# Patient Record
Sex: FEMALE | Race: WHITE | NOT HISPANIC OR LATINO | Employment: OTHER | URBAN - METROPOLITAN AREA
[De-identification: names, ages, dates, MRNs, and addresses within clinical notes are randomized per-mention and may not be internally consistent; named-entity substitution may affect disease eponyms.]

---

## 2017-07-24 RX ORDER — LOSARTAN POTASSIUM AND HYDROCHLOROTHIAZIDE 25; 100 MG/1; MG/1
TABLET ORAL DAILY
COMMUNITY
Start: 2013-04-16 | End: 2017-07-24

## 2017-07-24 RX ORDER — DILTIAZEM HYDROCHLORIDE 240 MG/1
240 CAPSULE, EXTENDED RELEASE ORAL DAILY
Qty: 30 CAPSULE | Refills: 0 | Status: SHIPPED | OUTPATIENT
Start: 2017-07-24 | End: 2017-11-06 | Stop reason: SDUPTHER

## 2017-07-24 RX ORDER — LOSARTAN POTASSIUM AND HYDROCHLOROTHIAZIDE 25; 100 MG/1; MG/1
1 TABLET ORAL DAILY
Qty: 30 TABLET | Refills: 0 | Status: SHIPPED | OUTPATIENT
Start: 2017-07-24

## 2017-07-24 RX ORDER — POTASSIUM CHLORIDE 750 MG/1
10 TABLET, EXTENDED RELEASE ORAL 2 TIMES DAILY
Qty: 90 TABLET | Refills: 0 | Status: SHIPPED | OUTPATIENT
Start: 2017-07-24 | End: 2017-07-24 | Stop reason: SDUPTHER

## 2017-07-24 RX ORDER — LOSARTAN POTASSIUM AND HYDROCHLOROTHIAZIDE 25; 100 MG/1; MG/1
1 TABLET ORAL DAILY
Qty: 90 TABLET | Refills: 0 | Status: SHIPPED | OUTPATIENT
Start: 2017-07-24 | End: 2017-07-24 | Stop reason: SDUPTHER

## 2017-07-24 RX ORDER — DILTIAZEM HYDROCHLORIDE 240 MG/1
240 CAPSULE, EXTENDED RELEASE ORAL DAILY
Qty: 90 CAPSULE | Refills: 0 | Status: SHIPPED | OUTPATIENT
Start: 2017-07-24 | End: 2017-07-24 | Stop reason: SDUPTHER

## 2017-07-24 RX ORDER — POTASSIUM CHLORIDE 750 MG/1
10 TABLET, EXTENDED RELEASE ORAL DAILY
Qty: 30 TABLET | Refills: 0 | Status: SHIPPED | OUTPATIENT
Start: 2017-07-24 | End: 2017-11-06 | Stop reason: SDUPTHER

## 2017-11-06 RX ORDER — POTASSIUM CHLORIDE 750 MG/1
10 TABLET, EXTENDED RELEASE ORAL DAILY
Qty: 30 TABLET | Refills: 0 | Status: SHIPPED | OUTPATIENT
Start: 2017-11-06

## 2017-11-06 RX ORDER — DILTIAZEM HYDROCHLORIDE 240 MG/1
240 CAPSULE, EXTENDED RELEASE ORAL DAILY
Qty: 30 CAPSULE | Refills: 0 | Status: SHIPPED | OUTPATIENT
Start: 2017-11-06

## 2017-11-06 NOTE — TELEPHONE ENCOUNTER
Called pt to advise of med refill and that she would need to make an appointment for further refills, received no answer, lmovm for pt to call us back        E-prescribed Diltiazem  mg 1 po qd # 30 R 0   Potassium Chloride 10 MEQ CR 1 po qd # 30 R 0

## 2018-01-17 ENCOUNTER — TELEPHONE (OUTPATIENT)
Dept: FAMILY MEDICINE CLINIC | Facility: CLINIC | Age: 73
End: 2018-01-17

## 2018-01-17 NOTE — TELEPHONE ENCOUNTER
Received faxed med refill request, called pt on both numbers to advise she needs to make an appt to be seen for further refills, received no answer, lmovm for pt to call us back

## 2018-06-18 DIAGNOSIS — E87.6 LOW POTASSIUM SYNDROME: Primary | ICD-10-CM

## 2018-06-19 RX ORDER — POTASSIUM CHLORIDE 750 MG/1
10 TABLET, EXTENDED RELEASE ORAL DAILY
Qty: 30 TABLET | Refills: 0 | OUTPATIENT
Start: 2018-06-19

## 2018-06-26 ENCOUNTER — TELEPHONE (OUTPATIENT)
Dept: FAMILY MEDICINE CLINIC | Facility: CLINIC | Age: 73
End: 2018-06-26

## 2018-06-26 NOTE — TELEPHONE ENCOUNTER
PHARM CALLING TO HAVE MED REFILLED. KLOR-CON 10 MG 1 TAB PO QD. PT HASN'T BEEN SEEN IN OUR OFFICE SINCE NOV. 2017. ADV PHARM THAT PT NEEDS TO MAKE APPT.

## 2022-02-01 ENCOUNTER — OFFICE VISIT (OUTPATIENT)
Dept: ORTHOPEDIC SURGERY | Facility: CLINIC | Age: 77
End: 2022-02-01

## 2022-02-01 VITALS — WEIGHT: 193.6 LBS | BODY MASS INDEX: 30.39 KG/M2 | HEIGHT: 67 IN

## 2022-02-01 DIAGNOSIS — M75.52 BURSITIS OF LEFT SHOULDER: ICD-10-CM

## 2022-02-01 DIAGNOSIS — M75.42 IMPINGEMENT SYNDROME OF LEFT SHOULDER: ICD-10-CM

## 2022-02-01 DIAGNOSIS — M75.112 NONTRAUMATIC INCOMPLETE TEAR OF LEFT ROTATOR CUFF: Primary | ICD-10-CM

## 2022-02-01 DIAGNOSIS — S46.112A LABRAL TEAR OF LONG HEAD OF BICEPS TENDON, LEFT, INITIAL ENCOUNTER: ICD-10-CM

## 2022-02-01 PROCEDURE — 99204 OFFICE O/P NEW MOD 45 MIN: CPT | Performed by: ORTHOPAEDIC SURGERY

## 2022-02-01 RX ORDER — CETIRIZINE HYDROCHLORIDE 10 MG/1
10 TABLET ORAL DAILY
COMMUNITY

## 2022-02-01 NOTE — PROGRESS NOTES
[facility] [place] [facility] Office Visit - Ronal Barnhart MD    Office Visit       Patient Name: Mari Burns    Chief Complaint:   Chief Complaint   Patient presents with   • Left Shoulder - Pain       Referring Physician: Referring, Self    History of Present Illness:   Mari Burns is a 77 y.o. female who presents with left body part: shoulder Reason: pain.  Onset:Onset: mechanical fall. The issue has been ongoing for 3 month(s). Pain is a 0/10 on the pain scale. Pain is described as Pain Characterization: aching. Associated symptoms include Symptoms: swelling. The pain is worse with sleeping and leisure; ice and pain medication and/or NSAID improve the pain. Previous treatments have included: NSAIDS. I have reviewed the patient's history of present illness as noted/entered above.    I have reviewed the patient's past medical history, surgical history, social history, family history, medications, and allergies as noted in the electronic medical record and as noted/entered.  I have reviewed the patient's review of systems as noted/enter and updated as noted in the patient's HPI.      LEFT SHOULDER    2nd Opinion -- saw Dr. Jeison Mazariegos; completed 12/27/2021 injection - helped    Fell March 2021    Partial rotator cuff tearing, partial possible full thickness tearing  Biceps tendon      Subjective   Subjective      Review of Systems   Constitutional: Positive for activity change. Negative for chills, fatigue and fever.   HENT: Negative.  Negative for congestion and dental problem.    Eyes: Negative.  Negative for blurred vision.   Respiratory: Negative.  Negative for shortness of breath.    Cardiovascular: Negative.  Negative for leg swelling.   Gastrointestinal: Negative.  Negative for abdominal pain.   Endocrine: Negative.  Negative for polyuria.   Genitourinary: Negative.  Negative for difficulty urinating.   Musculoskeletal: Positive for  arthralgias and neck stiffness.   Skin: Negative.    Allergic/Immunologic: Negative.    Neurological: Negative.    Hematological: Negative.  Negative for adenopathy.   Psychiatric/Behavioral: Negative.  Negative for behavioral problems.        Past Medical History:   Past Medical History:   Diagnosis Date   • Diabetes (HCC)    • Hypertension    • Osteoarthritis (arthritis due to wear and tear of joints)        Past Surgical History: History reviewed. No pertinent surgical history.    Family History:   Family History   Problem Relation Age of Onset   • Osteoarthritis Mother    • Hypertension Mother    • Diabetes Mother    • Heart attack Father    • Cancer Brother        Social History:   Social History     Socioeconomic History   • Marital status:    Tobacco Use   • Smoking status: Never Smoker   • Smokeless tobacco: Never Used   Vaping Use   • Vaping Use: Never used   Substance and Sexual Activity   • Alcohol use: Never   • Drug use: Never   • Sexual activity: Defer       Medications:   Current Outpatient Medications:   •  cetirizine (zyrTEC) 10 MG tablet, Take 10 mg by mouth Daily., Disp: , Rfl:   •  diltiazem XR (DILACOR XR) 240 MG 24 hr capsule, Take 1 capsule by mouth Daily., Disp: 30 capsule, Rfl: 0  •  losartan-hydrochlorothiazide (HYZAAR) 100-25 MG per tablet, Take 1 tablet by mouth Daily., Disp: 30 tablet, Rfl: 0  •  metFORMIN (GLUCOPHAGE) 500 MG tablet, Take 500 mg by mouth 3 (Three) Times a Day., Disp: , Rfl:   •  potassium chloride (K-DUR,KLOR-CON) 10 MEQ CR tablet, Take 1 tablet by mouth Daily., Disp: 30 tablet, Rfl: 0    Allergies:   Allergies   Allergen Reactions   • Hydrocodone-Acetaminophen Nausea And Vomiting   • Oxycodone-Acetaminophen Nausea And Vomiting       The following portions of the patient's history were reviewed and updated as appropriate: allergies, current medications, past family history, past medical history, past social history, past surgical history and problem list.       "  Objective    Objective      Vital Signs:   Vitals:    02/01/22 1431   Weight: 87.8 kg (193 lb 9.6 oz)   Height: 170.2 cm (67\")       Ortho Exam:  General: no acute distress, comfortable  Vitals reviewed in chart    Musculoskeletal Exam    SIDE: LEFT SHOULDER  Shoulder Exam:    Tenderness: rotator cuff and biceps tendon    Range of motion measurements (degrees)  Forward flexion/Abduction/External rotation at side/ER at 90/IR at 90/IR position  Active: 140/140/50/80/80   Passive: same    Painful arc of motion: yes  No evidence of septic joint  Pain with forward flexion and abduction greater: 90 degrees  Impingement testing Neer's test - positive/painful  Impingement testing Hawkin's test - positive/painful    Rotator Cuff Testing:  Tenderness to palpation at rotator cuff - YES  Rotator cuff testing Charles's test - pain but good strength  Rotator cuff testing External rotation - no  Rotator cuff testing Lag signs - no  Rotator cuff testing Belly press - no  Pain with abduction great than 90 degrees - yes    Scapular dyskinesis - present, abnormal scapular motion    Long head of the biceps testing:  Brito's test for biceps & Speed's test - painful  Bicipital groove tenderness to palpation/tenderness to palpation of biceps tendon - painful        Results Review:   Imaging Results (Last 24 Hours)     ** No results found for the last 24 hours. **          I personally interpreted her LEFT SHOULDER MRI 12/21/2021  LEFT SHOULDER  Partial tearing supra/infra/subscapularis  Partial vs full thickness tear LHB  Counseled the patient on the MRI findings    Procedures           Assessment / Plan      Assessment/Plan:   Problem List Items Addressed This Visit        Musculoskeletal and Injuries    Nontraumatic incomplete tear of left rotator cuff - Primary    Relevant Orders    Ambulatory Referral to Physical Therapy Evaluate and treat, Ortho (Completed)    Ambulatory Referral to Physical Therapy Evaluate and treat, Ortho " (Completed)    Bursitis of left shoulder    Relevant Orders    Ambulatory Referral to Physical Therapy Evaluate and treat, Ortho (Completed)    Ambulatory Referral to Physical Therapy Evaluate and treat, Ortho (Completed)    Impingement syndrome of left shoulder    Relevant Orders    Ambulatory Referral to Physical Therapy Evaluate and treat, Ortho (Completed)    Ambulatory Referral to Physical Therapy Evaluate and treat, Ortho (Completed)       Other    Labral tear of long head of biceps tendon, left, initial encounter    Relevant Orders    Ambulatory Referral to Physical Therapy Evaluate and treat, Ortho (Completed)    Ambulatory Referral to Physical Therapy Evaluate and treat, Ortho (Completed)          Counseled on operative vs nonoperative treatment LEFT shoulder  Counseled on arthroscopy  Recommended conservative course  MRI reviewed/interpreted and counseled  Balance issues - Silver Sneakers helping, Yoga for balance      Counseled on operative versus nonoperative treatment.  Patient desires to proceed with nonoperative course and I agree with that recommendation.  I think she will continue to do quite well with nonoperative treatment of the shoulder I will see her back in 2 months to reassess.  I did discuss what operative intervention would look like versus nonsurgical course and I think she has a fair bit of findings on MRI but no obvious significant full-thickness tearing primarily partial tearing on the rotator cuff side.    Follow Up: 2 months to reassess        Ronal Barnhart MD, FAAOS  Orthopedic Surgeon  Fellowship Trained Shoulder and Elbow Surgeon  [facility]  Orthopedics and Sports Medicine  [address] [place] [address], [address]  [place] [address]    02/01/22  15:08 EST

## 2025-01-23 ENCOUNTER — OFFICE VISIT (OUTPATIENT)
Dept: ORTHOPEDIC SURGERY | Facility: CLINIC | Age: 80
End: 2025-01-23
Payer: MEDICARE

## 2025-01-23 VITALS
SYSTOLIC BLOOD PRESSURE: 122 MMHG | WEIGHT: 193.56 LBS | BODY MASS INDEX: 30.38 KG/M2 | DIASTOLIC BLOOD PRESSURE: 80 MMHG | HEIGHT: 67 IN

## 2025-01-23 DIAGNOSIS — M75.21 BICEPS TENDINITIS OF RIGHT UPPER EXTREMITY: ICD-10-CM

## 2025-01-23 DIAGNOSIS — R29.818 PSEUDOPARALYSIS: ICD-10-CM

## 2025-01-23 DIAGNOSIS — S46.011A TRAUMATIC COMPLETE TEAR OF RIGHT ROTATOR CUFF, INITIAL ENCOUNTER: Primary | ICD-10-CM
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
## 2025-01-23 DIAGNOSIS — M25.511 RIGHT SHOULDER PAIN, UNSPECIFIED CHRONICITY: ICD-10-CM

## 2025-01-23 RX ORDER — CETIRIZINE HYDROCHLORIDE 5 MG/1
5 TABLET ORAL DAILY
COMMUNITY

## 2025-01-23 RX ORDER — DILTIAZEM HYDROCHLORIDE 240 MG/1
1 CAPSULE, COATED, EXTENDED RELEASE ORAL DAILY
COMMUNITY
Start: 2024-12-09

## 2025-01-23 RX ORDER — ATORVASTATIN CALCIUM 10 MG/1
1 TABLET, FILM COATED ORAL DAILY
COMMUNITY

## 2025-01-23 RX ORDER — ASPIRIN 81 MG/1
1 TABLET ORAL DAILY
COMMUNITY

## 2025-01-23 RX ORDER — POTASSIUM CHLORIDE 750 MG/1
1 CAPSULE, EXTENDED RELEASE ORAL DAILY PRN
COMMUNITY

## 2025-01-23 NOTE — PROGRESS NOTES
[facility] [place] [facility] Office Visit - Ronal Barnhart MD  [facility]    Office Visit       Patient Name: Mari Burns    Chief Complaint:   Chief Complaint   Patient presents with    Right Shoulder - Pain     Fall 12/05/2024       Referring Physician: No ref. provider found    History of Present Illness:   Mari Burns is a 79 y.o. female who presents with right body part: shoulder Reason: pain.  Onset:Onset: mechanical fall. The issue has been ongoing for 6 week(s). Pain is a 3/10 on the pain scale. Pain is described as Pain Characterization: dull, aching, and throbbing. Associated symptoms include Symptoms: pain. The pain is worse with  lifting arm overhead outstretched ; resting, ice, heat, and pain medication and/or NSAID improve the pain. Previous treatments have included: NSAIDS. I have reviewed the patient's history of present illness as noted/entered above.    I have reviewed the patient's past medical history, surgical history, social history, family history, medications, and allergies as noted in the electronic medical record and as noted/entered.  I have reviewed the patient's review of systems as noted/enter and updated as noted in the patient's HPI.      LAST VISIT:  2/1/2022 - LEFT SHOULDER     2nd Opinion -- saw Dr. Jeison Mazariegos; completed 12/27/2021 injection - helped     Fell March 2021     Partial rotator cuff tearing, partial possible full thickness tearing  Biceps tendon       1/23/2025:  NEW EVALUATION  Contralateral RIGHT SHOULDER  Fall 12/5/2024, no obvious fracture on xray  Fall in [place] with new boots on    Kavita  Upcoming birthday        79 y.o. female  Body mass index is 30.31 kg/m².    Subjective   Subjective      Review of Systems   Constitutional: Negative.  Negative for chills, fatigue and fever.   HENT: Negative.  Negative for congestion and dental problem.     Eyes: Negative.  Negative for blurred vision.   Respiratory: Negative.  Negative for shortness of breath.    Cardiovascular: Negative.  Negative for leg swelling.   Gastrointestinal: Negative.  Negative for abdominal pain.   Endocrine: Negative.  Negative for polyuria.   Genitourinary: Negative.  Negative for difficulty urinating.   Musculoskeletal:  Positive for arthralgias.   Skin: Negative.    Allergic/Immunologic: Negative.    Neurological: Negative.    Hematological: Negative.  Negative for adenopathy.   Psychiatric/Behavioral: Negative.  Negative for behavioral problems.         Past Medical History:   Past Medical History:   Diagnosis Date    Diabetes     Hypertension     Osteoarthritis (arthritis due to wear and tear of joints)        Past Surgical History: No past surgical history on file.    Family History:   Family History   Problem Relation Age of Onset    Osteoarthritis Mother     Hypertension Mother     Diabetes Mother     Heart attack Father     Cancer Brother        Social History:   Social History     Socioeconomic History    Marital status:    Tobacco Use    Smoking status: Never     Passive exposure: Never    Smokeless tobacco: Never   Vaping Use    Vaping status: Never Used   Substance and Sexual Activity    Alcohol use: Never    Drug use: Never    Sexual activity: Defer       Medications:   Current Outpatient Medications:     aspirin 81 MG EC tablet, Take 1 tablet by mouth Daily., Disp: , Rfl:     atorvastatin (LIPITOR) 10 MG tablet, Take 1 tablet by mouth Daily., Disp: , Rfl:     Barberry-Oreg Grape-Goldenseal (BERBERINE COMPLEX PO), Take  by mouth., Disp: , Rfl:     cetirizine (zyrTEC) 5 MG tablet, Take 1 tablet by mouth Daily., Disp: , Rfl:     dilTIAZem CD (CARDIZEM CD) 240 MG 24 hr capsule, Take 1 capsule by mouth Daily., Disp: , Rfl:     losartan-hydrochlorothiazide (HYZAAR) 100-25 MG per tablet, Take 1 tablet by mouth Daily., Disp: 30 tablet, Rfl: 0    metFORMIN (GLUCOPHAGE) 1000  "MG tablet, Take 1 tablet by mouth 2 (Two) Times a Day., Disp: , Rfl:     potassium chloride (MICRO-K) 10 MEQ CR capsule, Take 1 capsule by mouth Daily As Needed., Disp: , Rfl:     Allergies:   Allergies   Allergen Reactions    Hydrocodone-Acetaminophen Nausea And Vomiting    Oxycodone-Acetaminophen Nausea And Vomiting       The following portions of the patient's history were reviewed and updated as appropriate: allergies, current medications, past family history, past medical history, past social history, past surgical history and problem list.        Objective    Objective      Vital Signs:   Vitals:    01/23/25 1312   BP: 122/80   Weight: 87.8 kg (193 lb 9 oz)   Height: 170.2 cm (67.01\")       Ortho Exam:  RIGHT SHOULDER  General: no acute distress, comfortable  Vitals reviewed in chart    Musculoskeletal Exam    SIDE: RIGHT SHOULDER  Shoulder Exam:    Tenderness: rotator cuff, mild biceps    Range of motion measurements (degrees)  Forward flexion/Abduction/External rotation at side/ER at 90/IR at 90/IR position  Active: Right shoulder dysrhythmic motion.  She is able to sort of punch the arm above shoulder but with the arm at the side she is limited below 90 degrees forward flexion and abduction.  Weakness with external rotation and Jobes testing.  Passive: 130/130/40    Painful arc of motion: yes  No evidence of septic joint  Pain with forward flexion and abduction greater: 90 degrees  Impingement testing Neer's test - positive/painful  Impingement testing Hawkin's test - positive/painful    Rotator Cuff Testing:  Tenderness to palpation at rotator cuff - YES  Rotator cuff testing Charles's test - positive  Rotator cuff testing External rotation -positive  Rotator cuff testing Lag signs -significant weakness with Jobes and external rotation testing  Rotator cuff testing Belly press - no  Pain with abduction great than 90 degrees - yes    Scapular dyskinesis - present, abnormal scapular motion    Long head of the " biceps testing:  Brito's test for biceps & Speed's test -positive  Bicipital groove tenderness to palpation/tenderness to palpation of biceps tendon -positive      Results Review:   Imaging Results (Last 24 Hours)       Procedure Component Value Units Date/Time    XR Shoulder 2+ View Right [[id number]] Resulted: 01/23/25 1325     Updated: 01/23/25 1325    Narrative:      Imaging: shoulder x-rays 2 views - AP and axillary x-ray views    Side: RIGHT SHOULDER    Indication for shoulder x-ray 2 views: shoulder pain    Comparison: no comparison views available    Findings: No acute bony pathology. No superior humeral head migration.    The humeral head remains centered in the glenohumeral joint. No evidence   of calcific tendonitis.  Baseline degenerative AC joint changes.    I personally reviewed the above x-rays.            Procedures             Assessment / Plan      Assessment/Plan:   Problem List Items Addressed This Visit          Musculoskeletal and Injuries    Cause of injury, fall    Relevant Orders    MRI Shoulder Right Without Contrast    Traumatic complete tear of right rotator cuff - Primary    Relevant Orders    MRI Shoulder Right Without Contrast    Right shoulder pain    Relevant Orders    XR Shoulder 2+ View Right (Completed)    MRI Shoulder Right Without Contrast    Biceps tendinitis of right upper extremity    Relevant Orders    MRI Shoulder Right Without Contrast       Other    Pseudoparalysis    Relevant Orders    MRI Shoulder Right Without Contrast       RIGHT SHOULDER  MRI of the shoulder is recommended.  Indication: suspected rotator cuff tear  The MRI is critical to evaluate for rotator cuff tearing and will help with possible surgical planning.      Follow Up: AFTER RIGHT SHOULDER MRI, [facility] -- Hiro Barnhart MD, FAAOS  Orthopedic Surgeon, Shoulder Surgery  [facility]  Orthopedics and Sports Medicine  [address], [address]  [place]  [address]    & New Location:  [facility] Location  [address], [address]  [place] [address]    01/23/25  13:49 EST

## 2025-01-29 ENCOUNTER — TELEPHONE (OUTPATIENT)
Dept: ORTHOPEDIC SURGERY | Facility: CLINIC | Age: 80
End: 2025-01-29
Payer: MEDICARE

## 2025-01-29 NOTE — TELEPHONE ENCOUNTER
Caller: JOSSY   Relationship to Patient: SELF  Phone Number: [phone]  Reason for Call:   JOVANY WANTS MRI ORDER TO BE UPDATED TO [facility]

## 2025-01-29 NOTE — TELEPHONE ENCOUNTER
*HUB OK TO RELAY*    I HAVE ADDED [facility] IN THE REFERRAL. WE ARE WORKING HARD TO GET THROUGH ALL OF OUR MRI/CT ORDERS. ONCE WE'RE ABLE TO SUBMIT HERS TO INSURANCE AND GET AN APPROVAL, WE WILL FAX EVERYTHING TO [facility] AND THEY WILL CALL HER TO SCHEDULE.     THIS COULD TAKE ANOTHER WEEK AS WE ARE STILL CATCHING UP FROM THE HOLIDAYS AND WEATHER.

## 2025-01-30 ENCOUNTER — TELEPHONE (OUTPATIENT)
Dept: ORTHOPEDIC SURGERY | Facility: CLINIC | Age: 80
End: 2025-01-30
Payer: MEDICARE

## 2025-01-30 NOTE — TELEPHONE ENCOUNTER
I've spoke with Mrs. Burns. When I went to submit her authorization through [facility], there was already an MRI approval from her PCP's office. Insurance will not allow us to obtain a second authorization while there is still an open approval.    I spoke with Dr. Downing's office to have them fax that approval and order to [facility] and they told me they would.     I advised Mrs. Burns to follow up with either [facility] or Dr. Downing's office tomorrow to see if the order was sent/received. She verbalized understanding and expressed thanks for my call.     HUB: the patient is having her MRI through her PCP office. Once she has her MRI scheduled at [facility], I've advised her to call us and schedule an MRI follow up for at least three days after the MRI. HUB OK to schedule that follow up for at least three days after the MRI.    *Patient was also advised to bring MRI disc to f/u appt*

## 2025-01-30 NOTE — TELEPHONE ENCOUNTER
I've spoke with Mrs. Burns. When I went to submit her authorization through [facility], there was already an MRI approval from her PCP's office. Insurance will not allow us to obtain a second authorization while there is still an open approval.     I spoke with Dr. Downing's office to have them fax that approval and order to [facility] and they told me they would.      I advised Mrs. Burns to follow up with either [facility] or Dr. Downing's office tomorrow to see if the order was sent/received. She verbalized understanding and expressed thanks for my call.      HUB: the patient is having her MRI through her PCP office. Once she has her MRI scheduled at [facility], I've advised her to call us and schedule an MRI follow up for at least three days after the MRI. HUB OK to schedule that follow up for at least three days after the MRI.     *Patient was also advised to bring MRI disc to f/u appt*

## 2025-01-30 NOTE — TELEPHONE ENCOUNTER
Provider: JOHAN    Caller: Mari Burns    Relationship to Patient: Self    Pharmacy:     Phone Number: [phone]    Reason for Call: PT CALLED TO LET US KNOW THAT SHE RECEIVED A TEXT FROM [employer] THAT STATES THAT HER MRI WAS APPROVED ON TUESDAY, I ADVISED I WAS UNSURE IF IT WAS APPROVED TO BE DONE AT  AND NOT [facility] AND THEY MAY HAVE TO GET ANOTHER ONE. ALSO ADVISED THAT WE HAVE CLINT RECEIVED THE AUTH IN HER CHART AND RELAYED FROM THE OTHER TE WHAT REBECCA HAD STATED, PT IS UPSET THAT IT COULD TAKE ANOTHER WEEK.

## 2025-02-11 ENCOUNTER — OFFICE VISIT (OUTPATIENT)
Age: 80
End: 2025-02-11
Payer: MEDICARE

## 2025-02-11 VITALS
HEIGHT: 67 IN | DIASTOLIC BLOOD PRESSURE: 76 MMHG | SYSTOLIC BLOOD PRESSURE: 148 MMHG | WEIGHT: 193 LBS | BODY MASS INDEX: 30.29 KG/M2

## 2025-02-11 DIAGNOSIS — S46.119A LABRAL TEAR OF LONG HEAD OF BICEPS TENDON, INITIAL ENCOUNTER: ICD-10-CM

## 2025-02-11 DIAGNOSIS — M25.511 RIGHT SHOULDER PAIN, UNSPECIFIED CHRONICITY: ICD-10-CM

## 2025-02-11 DIAGNOSIS — S46.011A TRAUMATIC COMPLETE TEAR OF RIGHT ROTATOR CUFF, INITIAL ENCOUNTER: Primary | ICD-10-CM
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
## 2025-02-11 DIAGNOSIS — M75.21 BICEPS TENDINITIS OF RIGHT UPPER EXTREMITY: ICD-10-CM

## 2025-02-11 NOTE — PROGRESS NOTES
[facility] Orthopaedic Surgery Office Follow Up - Ronal Barnhart MD  [facility] and [facility]    Office Follow Up Visit       Patient Name: Mari Burns    Chief Complaint:   Chief Complaint   Patient presents with    Follow-up     3 week follow up -- Traumatic complete tear of right rotator cuff -- MRI completed 2/3/25       Referring Physician: No ref. provider found  - I appreciate the referral    History of Present Illness:   It has been 3  week(s) since Mari Burns's last visit. Mari Burns returns to clinic today for F/U: follow-up of rightBody Part: shoulderReason: pain. The issue has been ongoing for 2 month(s). Mari Burns rates HIS/HER: herpain at 4/10 on the pain scale. Previous/current treatments: NSAIDS. Current symptoms:Symptoms: pain. The pain is worse with any movement of the joint; resting improves the pain. Overall, he/she: sheis doing the same. I have reviewed the patient's history of present illness as noted/entered above.    I have reviewed the patient's past medical history, surgical history, social history, family history, medications, and allergies as noted in the electronic medical record and as noted/entered.  I have reviewed the patient's review of systems as noted/enter and updated as noted in the patient's HPI.      RIGHT SHOULDER     1/23/2025:  NEW EVALUATION  Contralateral RIGHT SHOULDER  Fall 12/5/2024, no obvious fracture on xray  Fall in [employer] with new boots on     Kavita  Upcoming birthday      2/11/2025:  RIGHT SHOULDER  MRI completed conner  on 2/3/2025; rotator cuff tearing, LHB tearing --she was aware chronic long head of the biceps tearing.  I personally reviewed and interpreted the MRI.  She has fatty infiltration atrophy particular the supraspinatus, infraspinatus.  She has positive tangent sign with supraspinatus appears to have an acute on chronic type picture with  fairly significant supraspinatus into the infraspinatus tearing and some upper border subscapularis tearing as well.  Remains stoic on clinical exam.  Counseled on operative versus nonoperative measures.  She is very hopeful to avoid surgery and I agree.  Very reasonable stick with conservative course she does have significant rotator cuff tearing but appears to be more chronic in nature.  Supportive  present.            79 y.o. female  Body mass index is 30.31 kg/m².      Subjective   Subjective      Review of Systems   Constitutional: Negative.  Negative for chills, fatigue and fever.   HENT: Negative.  Negative for congestion and dental problem.    Eyes: Negative.  Negative for blurred vision.   Respiratory: Negative.  Negative for shortness of breath.    Cardiovascular: Negative.  Negative for leg swelling.   Gastrointestinal: Negative.  Negative for abdominal pain.   Endocrine: Negative.  Negative for polyuria.   Genitourinary: Negative.  Negative for difficulty urinating.   Musculoskeletal:  Positive for arthralgias.   Skin: Negative.    Allergic/Immunologic: Negative.    Neurological: Negative.    Hematological: Negative.  Negative for adenopathy.   Psychiatric/Behavioral: Negative.  Negative for behavioral problems.         Past Medical History:   Past Medical History:   Diagnosis Date    Diabetes     Hypertension     Osteoarthritis (arthritis due to wear and tear of joints)        Past Surgical History: No past surgical history on file.    Family History:   Family History   Problem Relation Age of Onset    Osteoarthritis Mother     Hypertension Mother     Diabetes Mother     Heart attack Father     Cancer Brother        Social History:   Social History     Socioeconomic History    Marital status:    Tobacco Use    Smoking status: Never     Passive exposure: Never    Smokeless tobacco: Never   Vaping Use    Vaping status: Never Used   Substance and Sexual Activity    Alcohol use: Never    Drug  "use: Never    Sexual activity: Defer       Medications:   Current Outpatient Medications:     aspirin 81 MG EC tablet, Take 1 tablet by mouth Daily., Disp: , Rfl:     atorvastatin (LIPITOR) 10 MG tablet, Take 1 tablet by mouth Daily., Disp: , Rfl:     Barberry-Oreg Grape-Goldenseal (BERBERINE COMPLEX PO), Take  by mouth., Disp: , Rfl:     cetirizine (zyrTEC) 5 MG tablet, Take 1 tablet by mouth Daily., Disp: , Rfl:     dilTIAZem CD (CARDIZEM CD) 240 MG 24 hr capsule, Take 1 capsule by mouth Daily., Disp: , Rfl:     losartan-hydrochlorothiazide (HYZAAR) 100-25 MG per tablet, Take 1 tablet by mouth Daily., Disp: 30 tablet, Rfl: 0    metFORMIN (GLUCOPHAGE) 1000 MG tablet, Take 1 tablet by mouth 2 (Two) Times a Day., Disp: , Rfl:     potassium chloride (MICRO-K) 10 MEQ CR capsule, Take 1 capsule by mouth Daily As Needed., Disp: , Rfl:     Allergies:   Allergies   Allergen Reactions    Hydrocodone-Acetaminophen Nausea And Vomiting    Oxycodone-Acetaminophen Nausea And Vomiting       The following portions of the patient's history were reviewed and updated as appropriate: allergies, current medications, past family history, past medical history, past social history, past surgical history and problem list.        Objective    Objective      Vital Signs:   Vitals:    02/11/25 1257   BP: 148/76   Weight: 87.5 kg (193 lb)   Height: 170.2 cm (67\")       Ortho Exam:  Right shoulder she still is able to forward flex and abduct overhead weakness with Jobes testing weakness with external rotation as noted, chronic long head of the biceps rupture.  Overall remains stoic    Results Review:  Imaging Results (Last 24 Hours)       ** No results found for the last 24 hours. **          [facility] diagnostic right shoulder MRI 2/3/2025      I personally reviewed and personally interpreted the imaging above.  RIGHT SHOULDER  MRI completed conner dx on 2/3/2025; rotator cuff tearing, LHB tearing --she was aware chronic long head of the biceps " tearing.  I personally reviewed and interpreted the MRI.  She has fatty infiltration atrophy particular the supraspinatus, infraspinatus.  She has positive tangent sign with supraspinatus appears to have an acute on chronic type picture with fairly significant supraspinatus into the infraspinatus tearing and some upper border subscapularis tearing as well.    Full-thickness rotator cuff tearing supraspinatus extension the infraspinatus.    Procedures            Assessment / Plan      Assessment/Plan:   Problem List Items Addressed This Visit          Musculoskeletal and Injuries    Cause of injury, fall    Relevant Orders    Ambulatory Referral to Physical Therapy for Evaluation & Treatment    Traumatic complete tear of right rotator cuff - Primary    Relevant Orders    Ambulatory Referral to Physical Therapy for Evaluation & Treatment    Right shoulder pain    Relevant Orders    Ambulatory Referral to Physical Therapy for Evaluation & Treatment    Biceps tendinitis of right upper extremity    Relevant Orders    Ambulatory Referral to Physical Therapy for Evaluation & Treatment       Other    Labral tear of long head of biceps tendon, initial encounter    Relevant Orders    Ambulatory Referral to Physical Therapy for Evaluation & Treatment       RIGHT SHOULDER  MRI completed conner dx on 2/3/2025; rotator cuff tearing, LHB tearing --she was aware chronic long head of the biceps tearing.  I personally reviewed and interpreted the MRI.  She has fatty infiltration atrophy particular the supraspinatus, infraspinatus.  She has positive tangent sign with supraspinatus appears to have an acute on chronic type picture with fairly significant supraspinatus into the infraspinatus tearing and some upper border subscapularis tearing as well.  Remains stoic on clinical exam.  Counseled on operative versus nonoperative measures.  She is very hopeful to avoid surgery and I agree.  Very reasonable stick with conservative course she  does have significant rotator cuff tearing but appears to be more chronic in nature.  Supportive  present.    Operative versus nonoperative measures discussed.    She prefers physical therapy at [facility] physical therapy in [place] with her prior PT provider.  We will place an order.    Follow Up: 2 to 3 months  X-rays at next clinic appointment: None      Ronal Barnhart MD, FAAOS  Orthopedic Surgeon, Shoulder Surgery  [facility]  Orthopedics and Sports Medicine  [address], [address]  [place] [address]    & New Location:  [facility] Location  [address], [address]  [place] [address]    02/14/25  11:06 EST

## 2025-02-14 ENCOUNTER — TELEPHONE (OUTPATIENT)
Dept: ORTHOPEDIC SURGERY | Facility: CLINIC | Age: 80
End: 2025-02-14

## 2025-02-14 NOTE — TELEPHONE ENCOUNTER
Caller: Mari Burns    Relationship: Self    Best call back number: [phone]    What is the best time to reach you: ANY     Who are you requesting to speak with (clinical staff, provider,  specific staff member): CLINICAL     Do you know the name of the person who called: YES    What was the call regarding: PATIENT WOULD LIKE A CALL BACK ABOUT PHYSICAL THERAPY

## 2025-04-15 ENCOUNTER — OFFICE VISIT (OUTPATIENT)
Age: 80
End: 2025-04-15
Payer: MEDICARE

## 2025-04-15 VITALS
WEIGHT: 196.4 LBS | DIASTOLIC BLOOD PRESSURE: 82 MMHG | BODY MASS INDEX: 30.83 KG/M2 | HEIGHT: 67 IN | SYSTOLIC BLOOD PRESSURE: 148 MMHG

## 2025-04-15 DIAGNOSIS — M75.21 BICEPS TENDINITIS OF RIGHT UPPER EXTREMITY: ICD-10-CM

## 2025-04-15 DIAGNOSIS — S46.119A LABRAL TEAR OF LONG HEAD OF BICEPS TENDON, INITIAL ENCOUNTER: ICD-10-CM

## 2025-04-15 DIAGNOSIS — S46.011A TRAUMATIC COMPLETE TEAR OF RIGHT ROTATOR CUFF, INITIAL ENCOUNTER: ICD-10-CM
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
## 2025-04-15 DIAGNOSIS — M25.511 RIGHT SHOULDER PAIN, UNSPECIFIED CHRONICITY: ICD-10-CM

## 2025-04-15 NOTE — PROGRESS NOTES
[facility] Office Follow Up - Ronal Barnhart MD  [facility] and [facility]    Office Follow Up Visit       Patient Name: Mari Burns    Chief Complaint:   Chief Complaint   Patient presents with    Follow-up     2 month f/u-- Traumatic complete tear of right rotator cuff       Referring Physician: No ref. provider found  - I appreciate the referral    History of Present Illness:   It has been 2  month(s) since Mari Burns's last visit. Mari Burns returns to clinic today for F/U: follow-up of rightBody Part: shoulderReason: pain. The issue has been ongoing for 4 month(s). Mari Burns rates HIS/HER: herpain at 1/10 on the pain scale. Previous/current treatments: physical therapy. Current symptoms:Symptoms: pain. The pain is worse with  raising arm up ; resting improves the pain. Overall, he/she: sheis doing better. I have reviewed the patient's history of present illness as noted/entered above.    I have reviewed the patient's past medical history, surgical history, social history, family history, medications, and allergies as noted in the electronic medical record and as noted/entered.  I have reviewed the patient's review of systems as noted/enter and updated as noted in the patient's HPI.      RIGHT SHOULDER     1/23/2025:  NEW EVALUATION  Contralateral RIGHT SHOULDER  Fall 12/5/2024, no obvious fracture on xray  Fall in [employer] with new boots on     Kavita  Upcoming birthday        2/11/2025:  RIGHT SHOULDER  MRI completed conner dx on 2/3/2025; rotator cuff tearing, LHB tearing --she was aware chronic long head of the biceps tearing.  I personally reviewed and interpreted the MRI.  She has fatty infiltration atrophy particular the supraspinatus, infraspinatus.  She has positive tangent sign with supraspinatus appears to have an acute on chronic type picture with fairly significant supraspinatus  into the infraspinatus tearing and some upper border subscapularis tearing as well.  Remains stoic on clinical exam.  Counseled on operative versus nonoperative measures.  She is very hopeful to avoid surgery and I agree.  Very reasonable stick with conservative course she does have significant rotator cuff tearing but appears to be more chronic in nature.  Supportive  present.        79 y.o. female  Body mass index is 30.31 kg/m².    4/15/2025:  RIGHT SHOULDER  Improved  KORT - Kavita  Significant baseline tearing, chronic findings        Subjective   Subjective      Review of Systems   Constitutional: Negative.  Negative for chills, fatigue and fever.   HENT: Negative.  Negative for congestion and dental problem.    Eyes: Negative.  Negative for blurred vision.   Respiratory: Negative.  Negative for shortness of breath.    Cardiovascular: Negative.  Negative for leg swelling.   Gastrointestinal: Negative.  Negative for abdominal pain.   Endocrine: Negative.  Negative for polyuria.   Genitourinary: Negative.  Negative for difficulty urinating.   Musculoskeletal:  Positive for arthralgias.   Skin: Negative.    Allergic/Immunologic: Negative.    Neurological: Negative.    Hematological: Negative.  Negative for adenopathy.   Psychiatric/Behavioral: Negative.  Negative for behavioral problems.         Past Medical History:   Past Medical History:   Diagnosis Date    Diabetes     Hypertension     Osteoarthritis (arthritis due to wear and tear of joints)        Past Surgical History: No past surgical history on file.    Family History:   Family History   Problem Relation Age of Onset    Osteoarthritis Mother     Hypertension Mother     Diabetes Mother     Heart attack Father     Cancer Brother        Social History:   Social History     Socioeconomic History    Marital status:    Tobacco Use    Smoking status: Never     Passive exposure: Never    Smokeless tobacco: Never   Vaping Use    Vaping status: Never  "Used   Substance and Sexual Activity    Alcohol use: Never    Drug use: Never    Sexual activity: Defer       Medications:   Current Outpatient Medications:     aspirin 81 MG EC tablet, Take 1 tablet by mouth Daily., Disp: , Rfl:     atorvastatin (LIPITOR) 10 MG tablet, Take 1 tablet by mouth Daily., Disp: , Rfl:     Barberry-Oreg Grape-Goldenseal (BERBERINE COMPLEX PO), Take  by mouth., Disp: , Rfl:     cetirizine (zyrTEC) 5 MG tablet, Take 1 tablet by mouth Daily., Disp: , Rfl:     dilTIAZem CD (CARDIZEM CD) 240 MG 24 hr capsule, Take 1 capsule by mouth Daily., Disp: , Rfl:     losartan-hydrochlorothiazide (HYZAAR) 100-25 MG per tablet, Take 1 tablet by mouth Daily., Disp: 30 tablet, Rfl: 0    metFORMIN (GLUCOPHAGE) 1000 MG tablet, Take 1 tablet by mouth 2 (Two) Times a Day., Disp: , Rfl:     potassium chloride (MICRO-K) 10 MEQ CR capsule, Take 1 capsule by mouth Daily As Needed., Disp: , Rfl:     Allergies:   Allergies   Allergen Reactions    Hydrocodone-Acetaminophen Nausea And Vomiting    Oxycodone-Acetaminophen Nausea And Vomiting       The following portions of the patient's history were reviewed and updated as appropriate: allergies, current medications, past family history, past medical history, past social history, past surgical history and problem list.        Objective    Objective      Vital Signs:   Vitals:    04/15/25 1253   BP: 148/82   Weight: 89.1 kg (196 lb 6.4 oz)   Height: 170.2 cm (67.01\")       Ortho Exam:  Right shoulder improvements noted  Baseline deficits as anticipated    Results Review:  Imaging Results (Last 24 Hours)       ** No results found for the last 24 hours. **                Procedures            Assessment / Plan      Assessment/Plan:   Problem List Items Addressed This Visit          Musculoskeletal and Injuries    Cause of injury, fall - Primary    Relevant Orders    Ambulatory Referral to Physical Therapy for Evaluation & Treatment (Completed)    Traumatic complete tear of " right rotator cuff    Relevant Orders    Ambulatory Referral to Physical Therapy for Evaluation & Treatment (Completed)    Right shoulder pain    Relevant Orders    Ambulatory Referral to Physical Therapy for Evaluation & Treatment (Completed)    Biceps tendinitis of right upper extremity    Relevant Orders    Ambulatory Referral to Physical Therapy for Evaluation & Treatment (Completed)       Other    Labral tear of long head of biceps tendon, initial encounter    Relevant Orders    Ambulatory Referral to Physical Therapy for Evaluation & Treatment (Completed)       RIGHT SHOULDER  Continue with PT if approved and HEP continue  Counseled again on the complexities and nuances of her case with an acute injury but appears to have an acute on chronic type picture with more chronic rotator cuff findings.  She still has deficiencies with basic activities of daily living is little bit concerned about gardening this summer and spring but ultimately she has noted significant gains she was pseudoparalytic but now is able to perform overhead activities but with limitations.  Discussed again on operative versus nonoperative measures and fortunately we can stick with conservative course/nonoperative treatment given her improvement    Follow Up: Follow-up as needed.  The patient will keep me updated pending any changes.          Ronal Barnhart MD, FAAOS  Orthopedic Surgeon, Shoulder Surgery  [facility]  Orthopedics and Sports Medicine  [address] [place] [address], [address]  [place] [address]    & New Location:  [facility] Location  [address], [address]  [place] [address]    04/15/25  13:17 EDT

## 2025-06-03 ENCOUNTER — OFFICE VISIT (OUTPATIENT)
Age: 80
End: 2025-06-03
Payer: MEDICARE

## 2025-06-03 VITALS
SYSTOLIC BLOOD PRESSURE: 180 MMHG | HEIGHT: 67 IN | WEIGHT: 197.3 LBS | BODY MASS INDEX: 30.97 KG/M2 | DIASTOLIC BLOOD PRESSURE: 104 MMHG

## 2025-06-03 DIAGNOSIS — M25.511 RIGHT SHOULDER PAIN, UNSPECIFIED CHRONICITY: ICD-10-CM

## 2025-06-03 DIAGNOSIS — S46.119A LABRAL TEAR OF LONG HEAD OF BICEPS TENDON, INITIAL ENCOUNTER: ICD-10-CM

## 2025-06-03 DIAGNOSIS — S46.819A STRAIN OF DELTOID MUSCLE, INITIAL ENCOUNTER: ICD-10-CM

## 2025-06-03 DIAGNOSIS — R29.818 PSEUDOPARALYSIS: ICD-10-CM

## 2025-06-03 DIAGNOSIS — S46.011A TRAUMATIC COMPLETE TEAR OF RIGHT ROTATOR CUFF, INITIAL ENCOUNTER: ICD-10-CM
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
## 2025-06-03 RX ORDER — LIDOCAINE HYDROCHLORIDE 10 MG/ML
5 INJECTION, SOLUTION EPIDURAL; INFILTRATION; INTRACAUDAL; PERINEURAL
Status: COMPLETED | OUTPATIENT
Start: 2025-06-03 | End: 2025-06-03

## 2025-06-03 RX ORDER — TRIAMCINOLONE ACETONIDE 40 MG/ML
40 INJECTION, SUSPENSION INTRA-ARTICULAR; INTRAMUSCULAR
Status: COMPLETED | OUTPATIENT
Start: 2025-06-03 | End: 2025-06-03

## 2025-06-03 RX ADMIN — TRIAMCINOLONE ACETONIDE 40 MG: 40 INJECTION, SUSPENSION INTRA-ARTICULAR; INTRAMUSCULAR at 14:46

## 2025-06-03 RX ADMIN — LIDOCAINE HYDROCHLORIDE 5 ML: 10 INJECTION, SOLUTION EPIDURAL; INFILTRATION; INTRACAUDAL; PERINEURAL at 14:46

## 2025-06-03 NOTE — PROGRESS NOTES
Procedure   - Large Joint Arthrocentesis: R subacromial bursa on 6/3/2025 2:46 PM  Indications: pain  Details: 21 G needle, posterior approach  Medications: 5 mL lidocaine PF 1% 1 %; 40 mg triamcinolone acetonide 40 MG/ML  Outcome: tolerated well, no immediate complications  Procedure, treatment alternatives, risks and benefits explained, specific risks discussed. Consent was given by the patient. Immediately prior to procedure a time out was called to verify the correct patient, procedure, equipment, support staff and site/side marked as required. Patient was prepped and draped in the usual sterile fashion.

## 2025-06-03 NOTE — PROGRESS NOTES
[facility] [facility] [place] - Ronal Barnhart MD  [facility]    Office Follow Up Visit       Patient Name: Mari Burns    Chief Complaint:   Chief Complaint   Patient presents with    Follow-up     7 week follow up- Traumatic complete tear of right rotator cuff       Referring Physician: Saúl Downing*  - I appreciate the referral    History of Present Illness:   It has been 7  week(s) since Mari Burns's last visit. Mari Burns returns to clinic today for F/U: follow-up of rightBody Part: shoulderReason: pain. The issue has been ongoing for 5.5month(s). Mari Burns rates HIS/HER: herpain at 8/10 on the pain scale. Previous/current treatments: physical therapy and tylenol arthritis. Current symptoms:Symptoms: same as prior visit. The pain is worse with sleeping and any movement of the joint; resting and sitting improves the pain. Overall, he/she: sheis doing worse. I have reviewed the patient's history of present illness as noted/entered above.    I have reviewed the patient's past medical history, surgical history, social history, family history, medications, and allergies as noted in the electronic medical record and as noted/entered.  I have reviewed the patient's review of systems as noted/enter and updated as noted in the patient's HPI.      RIGHT SHOULDER     1/23/2025:  NEW EVALUATION  Contralateral RIGHT SHOULDER  Fall 12/5/2024, no obvious fracture on xray  Fall in [facility] with new boots on     [place]  Upcoming birthday        2/11/2025:  RIGHT SHOULDER  MRI completed [facility] dx on 2/3/2025; rotator cuff tearing, LHB tearing --she was aware chronic long head of the biceps tearing.  I personally reviewed and interpreted the MRI.  She has fatty infiltration atrophy particular the supraspinatus, infraspinatus.  She has positive tangent sign with supraspinatus appears to  have an acute on chronic type picture with fairly significant supraspinatus into the infraspinatus tearing and some upper border subscapularis tearing as well.  Remains stoic on clinical exam.  Counseled on operative versus nonoperative measures.  She is very hopeful to avoid surgery and I agree.  Very reasonable stick with conservative course she does have significant rotator cuff tearing but appears to be more chronic in nature.  Supportive  present.        79 y.o. female  Body mass index is 30.31 kg/m².     4/15/2025:  RIGHT SHOULDER  Improved  KORT - Kavita  Significant baseline tearing, chronic findings      6/3/2025:  RIGHT SHOULDER    Right shoulder MRI reviewed again.  Unfortunately she is doing worse not currently attending physical therapy she fell in April and move the arm around quickly, exacerbation.    Right shoulder acute exacerbation, tripped and fell caught herself did not land on the arm but she has deltoid strain/exacerbation    Right shoulder MRI [facility] diagnostic 2/3/2025 personally reviewed again today long head of the biceps tearing:        Subjective   Subjective      Review of Systems   Constitutional: Negative.  Negative for chills, fatigue and fever.   HENT: Negative.  Negative for congestion and dental problem.    Eyes: Negative.  Negative for blurred vision.   Respiratory: Negative.  Negative for shortness of breath.    Cardiovascular: Negative.  Negative for leg swelling.   Gastrointestinal: Negative.  Negative for abdominal pain.   Endocrine: Negative.  Negative for polyuria.   Genitourinary: Negative.  Negative for difficulty urinating.   Musculoskeletal:  Positive for arthralgias.   Skin: Negative.    Allergic/Immunologic: Negative.    Neurological: Negative.    Hematological: Negative.  Negative for adenopathy.   Psychiatric/Behavioral: Negative.  Negative for behavioral problems.         Past Medical History:   Past Medical History:   Diagnosis Date    Diabetes      Hypertension     Osteoarthritis (arthritis due to wear and tear of joints)        Past Surgical History: No past surgical history on file.    Family History:   Family History   Problem Relation Age of Onset    Osteoarthritis Mother     Hypertension Mother     Diabetes Mother     Heart attack Father     Cancer Brother        Social History:   Social History     Socioeconomic History    Marital status:    Tobacco Use    Smoking status: Never     Passive exposure: Never    Smokeless tobacco: Never   Vaping Use    Vaping status: Never Used   Substance and Sexual Activity    Alcohol use: Never    Drug use: Never    Sexual activity: Defer       Medications:   Current Outpatient Medications:     aspirin 81 MG EC tablet, Take 1 tablet by mouth Daily., Disp: , Rfl:     atorvastatin (LIPITOR) 10 MG tablet, Take 1 tablet by mouth Daily., Disp: , Rfl:     Barberry-Oreg Grape-Goldenseal (BERBERINE COMPLEX PO), Take  by mouth., Disp: , Rfl:     cetirizine (zyrTEC) 5 MG tablet, Take 1 tablet by mouth Daily., Disp: , Rfl:     dilTIAZem CD (CARDIZEM CD) 240 MG 24 hr capsule, Take 1 capsule by mouth Daily., Disp: , Rfl:     losartan-hydrochlorothiazide (HYZAAR) 100-25 MG per tablet, Take 1 tablet by mouth Daily., Disp: 30 tablet, Rfl: 0    metFORMIN (GLUCOPHAGE) 1000 MG tablet, Take 1 tablet by mouth 2 (Two) Times a Day., Disp: , Rfl:     potassium chloride (MICRO-K) 10 MEQ CR capsule, Take 1 capsule by mouth Daily As Needed., Disp: , Rfl:     Allergies:   Allergies   Allergen Reactions    Hydrocodone-Acetaminophen Nausea And Vomiting    Oxycodone-Acetaminophen Nausea And Vomiting       The following portions of the patient's history were reviewed and updated as appropriate: allergies, current medications, past family history, past medical history, past social history, past surgical history and problem list.        Objective    Objective      Vital Signs:   Vitals:    06/03/25 1356   BP: (!) 180/104   Weight: 89.5 kg (197 lb  "4.8 oz)   Height: 170.2 cm (67.01\")       Ortho Exam:  Right shoulder deltoid strain she is able to go overhead with deltoid compensation but deltoid insertional pain noted.    Results Review:  Imaging Results (Last 24 Hours)       ** No results found for the last 24 hours. **            Procedures    RIGHT SHOULDER SUBACROMIAL SPACE INJECTION: Risks and benefits of a shoulder subacromial space injection were discussed and the patient desired to proceed. Verbal consent was obtained. The patient understood the risk of infection, potential skin changes, bump in blood glucose especially with diabetes, nerve injury, possibility of increased pain in the short term, and possible incomplete pain relief.  Using sterile technique, the shoulder subacromial space was injected from a posterior approach with 1mL of 40 mg triamcinolone acetonide 40 MG/ML and 5cc of lidocaine with aspiration prior to injection. The patient tolerated the procedure without difficulty.  CPT CODE 93931 for major joint aspiration/injection          Assessment / Plan      Assessment/Plan:   Problem List Items Addressed This Visit          Musculoskeletal and Injuries    Cause of injury, fall - Primary    Traumatic complete tear of right rotator cuff    Right shoulder pain       Other    Pseudoparalysis    Labral tear of long head of biceps tendon, initial encounter    Strain of deltoid muscle, initial encounter   Right shoulder acute exacerbation  With a new but different type of fall    Right shoulder strain, deltoid strain.  Counseled again on treatment options including arthroscopic repair counseled risk and benefits of arthroscopy and our general feeling that we are hopeful to stick with conservative course here.  She generally avoids steroid injections but was hopeful for pain relief with an injection today which think is very reasonable.  She again expressed interest in avoiding operative intervention and I agree.    Follow Up: She will keep me " updated pending progress over the next 2 to 3 months  X-rays at next clinic appointment: None      Ronal Barnhart MD, FAAOS  Orthopedic Surgeon, Shoulder Surgery  [facility]  Orthopedics and Sports Medicine  [address], [address]  [place] [address]    & New Location:  [facility] Location  [address], [address]  [place] [address]    06/03/25  14:51 EDT